# Patient Record
Sex: MALE | Race: WHITE
[De-identification: names, ages, dates, MRNs, and addresses within clinical notes are randomized per-mention and may not be internally consistent; named-entity substitution may affect disease eponyms.]

---

## 2019-02-08 ENCOUNTER — HOSPITAL ENCOUNTER (INPATIENT)
Dept: HOSPITAL 94 - ER | Age: 49
LOS: 1 days | Discharge: HOME | DRG: 254 | End: 2019-02-09
Attending: INTERNAL MEDICINE | Admitting: INTERNAL MEDICINE
Payer: MEDICAID

## 2019-02-08 VITALS — SYSTOLIC BLOOD PRESSURE: 130 MMHG | DIASTOLIC BLOOD PRESSURE: 76 MMHG

## 2019-02-08 VITALS — DIASTOLIC BLOOD PRESSURE: 85 MMHG | SYSTOLIC BLOOD PRESSURE: 133 MMHG

## 2019-02-08 VITALS — HEIGHT: 72 IN | WEIGHT: 229.28 LBS | BODY MASS INDEX: 31.05 KG/M2

## 2019-02-08 DIAGNOSIS — Z79.899: ICD-10-CM

## 2019-02-08 DIAGNOSIS — Z90.49: ICD-10-CM

## 2019-02-08 DIAGNOSIS — K35.80: Primary | ICD-10-CM

## 2019-02-08 DIAGNOSIS — K51.90: ICD-10-CM

## 2019-02-08 DIAGNOSIS — M54.9: ICD-10-CM

## 2019-02-08 DIAGNOSIS — G89.29: ICD-10-CM

## 2019-02-08 LAB
ALBUMIN SERPL BCP-MCNC: 3.9 G/DL (ref 3.4–5)
ALBUMIN/GLOB SERPL: 1.3 {RATIO} (ref 1.1–1.5)
ALP SERPL-CCNC: 60 IU/L (ref 46–116)
ALT SERPL W P-5'-P-CCNC: 50 U/L (ref 12–78)
ANION GAP SERPL CALCULATED.3IONS-SCNC: 9 MMOL/L (ref 8–16)
AST SERPL W P-5'-P-CCNC: 19 U/L (ref 10–37)
BACTERIA URNS QL MICRO: (no result) /HPF
BASOPHILS # BLD AUTO: 0 X10'3 (ref 0–0.2)
BASOPHILS NFR BLD AUTO: 0.1 % (ref 0–1)
BILIRUB SERPL-MCNC: 0.3 MG/DL (ref 0.1–1)
BUN SERPL-MCNC: 21 MG/DL (ref 7–18)
BUN/CREAT SERPL: 16.4 (ref 5.4–32)
CALCIUM SERPL-MCNC: 9 MG/DL (ref 8.5–10.1)
CHLORIDE SERPL-SCNC: 103 MMOL/L (ref 99–107)
CLARITY UR: CLEAR
CO2 SERPL-SCNC: 28 MMOL/L (ref 24–32)
COLOR UR: (no result)
CREAT SERPL-MCNC: 1.28 MG/DL (ref 0.6–1.1)
DEPRECATED SQUAMOUS URNS QL MICRO: (no result) /LPF
EOSINOPHIL # BLD AUTO: 0.4 X10'3 (ref 0–0.9)
EOSINOPHIL NFR BLD AUTO: 2.5 % (ref 0–6)
ERYTHROCYTE [DISTWIDTH] IN BLOOD BY AUTOMATED COUNT: 12.9 % (ref 11.5–14.5)
GFR SERPL CREATININE-BSD FRML MDRD: 61 ML/MIN
GLUCOSE SERPL-MCNC: 117 MG/DL (ref 70–104)
GLUCOSE UR STRIP-MCNC: NEGATIVE MG/DL
HCT VFR BLD AUTO: 46.2 % (ref 42–52)
HGB BLD-MCNC: 15.7 G/DL (ref 14–17.9)
HGB UR QL STRIP: (no result)
INR PPP: 1 INR
KETONES UR STRIP-MCNC: NEGATIVE MG/DL
LEUKOCYTE ESTERASE UR QL STRIP: NEGATIVE
LIPASE SERPL-CCNC: 170 U/L (ref 73–393)
LYMPHOCYTES # BLD AUTO: 2.3 X10'3 (ref 1.1–4.8)
LYMPHOCYTES NFR BLD AUTO: 14 % (ref 21–51)
MCH RBC QN AUTO: 30.7 PG (ref 27–31)
MCHC RBC AUTO-ENTMCNC: 34.1 G/DL (ref 33–36.5)
MCV RBC AUTO: 89.9 FL (ref 78–98)
MONOCYTES # BLD AUTO: 0.6 X10'3 (ref 0–0.9)
MONOCYTES NFR BLD AUTO: 3.6 % (ref 2–12)
MUCOUS THREADS URNS QL MICRO: (no result) /LPF
NEUTROPHILS # BLD AUTO: 13.2 X10'3 (ref 1.8–7.7)
NEUTROPHILS NFR BLD AUTO: 79.8 % (ref 42–75)
NITRITE UR QL STRIP: NEGATIVE
PH UR STRIP: 6 [PH] (ref 4.8–8)
PLATELET # BLD AUTO: 302 X10'3 (ref 140–440)
PMV BLD AUTO: 7.1 FL (ref 7.4–10.4)
POTASSIUM SERPL-SCNC: 3.7 MMOL/L (ref 3.5–5.1)
PROT SERPL-MCNC: 7 G/DL (ref 6.4–8.2)
PROT UR QL STRIP: NEGATIVE MG/DL
PROTHROMBIN TIME: 10.2 SECONDS (ref 9–12)
RBC # BLD AUTO: 5.13 X10'6 (ref 4.7–6.1)
RBC #/AREA URNS HPF: (no result) /HPF (ref 0–2)
SODIUM SERPL-SCNC: 140 MMOL/L (ref 135–145)
SP GR UR STRIP: 1.02 (ref 1–1.03)
URN COLLECT METHOD CLASS: (no result)
UROBILINOGEN UR STRIP-MCNC: 0.2 E.U/DL (ref 0.2–1)
WBC # BLD AUTO: 16.5 X10'3 (ref 4.5–11)
WBC #/AREA URNS HPF: (no result) /HPF (ref 0–4)

## 2019-02-08 PROCEDURE — 85025 COMPLETE CBC W/AUTO DIFF WBC: CPT

## 2019-02-08 PROCEDURE — 36415 COLL VENOUS BLD VENIPUNCTURE: CPT

## 2019-02-08 PROCEDURE — 85610 PROTHROMBIN TIME: CPT

## 2019-02-08 PROCEDURE — 87040 BLOOD CULTURE FOR BACTERIA: CPT

## 2019-02-08 PROCEDURE — 81001 URINALYSIS AUTO W/SCOPE: CPT

## 2019-02-08 PROCEDURE — 74177 CT ABD & PELVIS W/CONTRAST: CPT

## 2019-02-08 PROCEDURE — 74176 CT ABD & PELVIS W/O CONTRAST: CPT

## 2019-02-08 PROCEDURE — 80048 BASIC METABOLIC PNL TOTAL CA: CPT

## 2019-02-08 PROCEDURE — 83690 ASSAY OF LIPASE: CPT

## 2019-02-08 PROCEDURE — 96361 HYDRATE IV INFUSION ADD-ON: CPT

## 2019-02-08 PROCEDURE — 83605 ASSAY OF LACTIC ACID: CPT

## 2019-02-08 PROCEDURE — 80053 COMPREHEN METABOLIC PANEL: CPT

## 2019-02-08 PROCEDURE — 83735 ASSAY OF MAGNESIUM: CPT

## 2019-02-08 PROCEDURE — 96375 TX/PRO/DX INJ NEW DRUG ADDON: CPT

## 2019-02-08 PROCEDURE — 87070 CULTURE OTHR SPECIMN AEROBIC: CPT

## 2019-02-08 PROCEDURE — 99285 EMERGENCY DEPT VISIT HI MDM: CPT

## 2019-02-08 PROCEDURE — 96365 THER/PROPH/DIAG IV INF INIT: CPT

## 2019-02-08 RX ADMIN — SODIUM CHLORIDE SCH MLS/HR: 9 INJECTION INTRAMUSCULAR; INTRAVENOUS; SUBCUTANEOUS at 17:07

## 2019-02-08 RX ADMIN — Medication SCH MMU: at 19:59

## 2019-02-08 RX ADMIN — TAZOBACTAM SODIUM AND PIPERACILLIN SODIUM SCH MLS/HR: 375; 3 INJECTION, SOLUTION INTRAVENOUS at 11:44

## 2019-02-08 RX ADMIN — DIATRIZOATE MEGLUMINE AND DIATRIZOATE SODIUM SCH ML: 660; 100 LIQUID ORAL; RECTAL at 21:37

## 2019-02-08 RX ADMIN — TAZOBACTAM SODIUM AND PIPERACILLIN SODIUM SCH MLS/HR: 375; 3 INJECTION, SOLUTION INTRAVENOUS at 19:58

## 2019-02-08 RX ADMIN — SODIUM CHLORIDE SCH MLS/HR: 9 INJECTION INTRAMUSCULAR; INTRAVENOUS; SUBCUTANEOUS at 07:07

## 2019-02-09 VITALS — SYSTOLIC BLOOD PRESSURE: 101 MMHG | DIASTOLIC BLOOD PRESSURE: 54 MMHG

## 2019-02-09 VITALS — DIASTOLIC BLOOD PRESSURE: 59 MMHG | SYSTOLIC BLOOD PRESSURE: 109 MMHG

## 2019-02-09 VITALS — SYSTOLIC BLOOD PRESSURE: 116 MMHG | DIASTOLIC BLOOD PRESSURE: 67 MMHG

## 2019-02-09 LAB
ALBUMIN SERPL BCP-MCNC: 3.2 G/DL (ref 3.4–5)
ANION GAP SERPL CALCULATED.3IONS-SCNC: 8 MMOL/L (ref 8–16)
BASOPHILS # BLD AUTO: 0 X10'3 (ref 0–0.2)
BASOPHILS NFR BLD AUTO: 0.5 % (ref 0–1)
BUN SERPL-MCNC: 9 MG/DL (ref 7–18)
BUN/CREAT SERPL: 8.1 (ref 5.4–32)
CALCIUM SERPL-MCNC: 9 MG/DL (ref 8.5–10.1)
CHLORIDE SERPL-SCNC: 107 MMOL/L (ref 99–107)
CO2 SERPL-SCNC: 25.9 MMOL/L (ref 24–32)
CREAT SERPL-MCNC: 1.11 MG/DL (ref 0.6–1.1)
EOSINOPHIL # BLD AUTO: 0.1 X10'3 (ref 0–0.9)
EOSINOPHIL NFR BLD AUTO: 2.2 % (ref 0–6)
ERYTHROCYTE [DISTWIDTH] IN BLOOD BY AUTOMATED COUNT: 12.7 % (ref 11.5–14.5)
GFR SERPL CREATININE-BSD FRML MDRD: 71 ML/MIN
GLUCOSE SERPL-MCNC: 99 MG/DL (ref 70–104)
HCT VFR BLD AUTO: 42.8 % (ref 42–52)
HGB BLD-MCNC: 14.8 G/DL (ref 14–17.9)
LYMPHOCYTES # BLD AUTO: 2.2 X10'3 (ref 1.1–4.8)
LYMPHOCYTES NFR BLD AUTO: 33.6 % (ref 21–51)
MAGNESIUM SERPL-MCNC: 1.8 MG/DL (ref 1.5–2.4)
MCH RBC QN AUTO: 31.2 PG (ref 27–31)
MCHC RBC AUTO-ENTMCNC: 34.5 G/DL (ref 33–36.5)
MCV RBC AUTO: 90.4 FL (ref 78–98)
MONOCYTES # BLD AUTO: 0.6 X10'3 (ref 0–0.9)
MONOCYTES NFR BLD AUTO: 8.3 % (ref 2–12)
NEUTROPHILS # BLD AUTO: 3.7 X10'3 (ref 1.8–7.7)
NEUTROPHILS NFR BLD AUTO: 55.4 % (ref 42–75)
PLATELET # BLD AUTO: 242 X10'3 (ref 140–440)
PMV BLD AUTO: 7 FL (ref 7.4–10.4)
POTASSIUM SERPL-SCNC: 3.7 MMOL/L (ref 3.5–5.1)
RBC # BLD AUTO: 4.74 X10'6 (ref 4.7–6.1)
SODIUM SERPL-SCNC: 141 MMOL/L (ref 135–145)
WBC # BLD AUTO: 6.7 X10'3 (ref 4.5–11)

## 2019-02-09 RX ADMIN — TAZOBACTAM SODIUM AND PIPERACILLIN SODIUM SCH MLS/HR: 375; 3 INJECTION, SOLUTION INTRAVENOUS at 03:27

## 2019-02-09 RX ADMIN — SODIUM CHLORIDE SCH MLS/HR: 9 INJECTION INTRAMUSCULAR; INTRAVENOUS; SUBCUTANEOUS at 13:07

## 2019-02-09 RX ADMIN — DIATRIZOATE MEGLUMINE AND DIATRIZOATE SODIUM SCH ML: 660; 100 LIQUID ORAL; RECTAL at 09:17

## 2019-02-09 RX ADMIN — TAZOBACTAM SODIUM AND PIPERACILLIN SODIUM SCH MLS/HR: 375; 3 INJECTION, SOLUTION INTRAVENOUS at 11:43

## 2019-02-09 RX ADMIN — SODIUM CHLORIDE SCH MLS/HR: 9 INJECTION INTRAMUSCULAR; INTRAVENOUS; SUBCUTANEOUS at 00:33

## 2019-02-09 RX ADMIN — DIATRIZOATE MEGLUMINE AND DIATRIZOATE SODIUM SCH ML: 660; 100 LIQUID ORAL; RECTAL at 07:17

## 2019-02-09 RX ADMIN — Medication SCH MMU: at 07:05

## 2020-06-09 ENCOUNTER — HOSPITAL ENCOUNTER (INPATIENT)
Dept: HOSPITAL 94 - ER | Age: 50
LOS: 2 days | Discharge: HOME | DRG: 234 | End: 2020-06-11
Attending: FAMILY MEDICINE | Admitting: SPECIALIST
Payer: MEDICAID

## 2020-06-09 VITALS — WEIGHT: 224.43 LBS | HEIGHT: 74 IN | BODY MASS INDEX: 28.8 KG/M2

## 2020-06-09 DIAGNOSIS — M54.9: ICD-10-CM

## 2020-06-09 DIAGNOSIS — K51.90: ICD-10-CM

## 2020-06-09 DIAGNOSIS — K35.80: Primary | ICD-10-CM

## 2020-06-09 DIAGNOSIS — G89.29: ICD-10-CM

## 2020-06-09 DIAGNOSIS — Z90.49: ICD-10-CM

## 2020-06-09 DIAGNOSIS — K52.9: ICD-10-CM

## 2020-06-09 DIAGNOSIS — E87.6: ICD-10-CM

## 2020-06-09 DIAGNOSIS — N17.9: ICD-10-CM

## 2020-06-09 LAB
ALBUMIN SERPL BCP-MCNC: 4.5 G/DL (ref 3.4–5)
ALBUMIN/GLOB SERPL: 1.3 {RATIO} (ref 1.1–1.5)
ALP SERPL-CCNC: 61 IU/L (ref 46–116)
ALT SERPL W P-5'-P-CCNC: 65 U/L (ref 12–78)
ANION GAP SERPL CALCULATED.3IONS-SCNC: 15 MMOL/L (ref 8–16)
AST SERPL W P-5'-P-CCNC: 42 U/L (ref 10–37)
BASOPHILS # BLD AUTO: 0.1 X10'3 (ref 0–0.2)
BASOPHILS NFR BLD AUTO: 0.3 % (ref 0–1)
BILIRUB SERPL-MCNC: 0.9 MG/DL (ref 0.1–1)
BUN SERPL-MCNC: 23 MG/DL (ref 7–18)
BUN/CREAT SERPL: 12.9 (ref 5.4–32)
CALCIUM SERPL-MCNC: 9 MG/DL (ref 8.5–10.1)
CHLORIDE SERPL-SCNC: 101 MMOL/L (ref 99–107)
CO2 SERPL-SCNC: 21.9 MMOL/L (ref 24–32)
CREAT SERPL-MCNC: 1.78 MG/DL (ref 0.6–1.1)
EOSINOPHIL # BLD AUTO: 0 X10'3 (ref 0–0.9)
EOSINOPHIL NFR BLD AUTO: 0.2 % (ref 0–6)
ERYTHROCYTE [DISTWIDTH] IN BLOOD BY AUTOMATED COUNT: 13.5 % (ref 11.5–14.5)
GFR SERPL CREATININE-BSD FRML MDRD: 41 ML/MIN
GLUCOSE SERPL-MCNC: 112 MG/DL (ref 70–104)
HCT VFR BLD AUTO: 49.1 % (ref 42–52)
HGB BLD-MCNC: 16.7 G/DL (ref 14–17.9)
LIPASE SERPL-CCNC: 89 U/L (ref 73–393)
LYMPHOCYTES # BLD AUTO: 2.2 X10'3 (ref 1.1–4.8)
LYMPHOCYTES NFR BLD AUTO: 10.2 % (ref 21–51)
MCH RBC QN AUTO: 30.5 PG (ref 27–31)
MCHC RBC AUTO-ENTMCNC: 34.1 G/DL (ref 33–36.5)
MCV RBC AUTO: 89.5 FL (ref 78–98)
MONOCYTES # BLD AUTO: 1.8 X10'3 (ref 0–0.9)
MONOCYTES NFR BLD AUTO: 8.1 % (ref 2–12)
NEUTROPHILS # BLD AUTO: 17.5 X10'3 (ref 1.8–7.7)
NEUTROPHILS NFR BLD AUTO: 81.2 % (ref 42–75)
PLATELET # BLD AUTO: 313 X10'3 (ref 140–440)
PMV BLD AUTO: 6.9 FL (ref 7.4–10.4)
POTASSIUM SERPL-SCNC: 3.4 MMOL/L (ref 3.5–5.1)
PROT SERPL-MCNC: 7.9 G/DL (ref 6.4–8.2)
RBC # BLD AUTO: 5.48 X10'6 (ref 4.7–6.1)
SODIUM SERPL-SCNC: 138 MMOL/L (ref 135–145)
WBC # BLD AUTO: 21.6 X10'3 (ref 4.5–11)

## 2020-06-09 PROCEDURE — 99285 EMERGENCY DEPT VISIT HI MDM: CPT

## 2020-06-09 PROCEDURE — 74176 CT ABD & PELVIS W/O CONTRAST: CPT

## 2020-06-09 PROCEDURE — 36415 COLL VENOUS BLD VENIPUNCTURE: CPT

## 2020-06-09 PROCEDURE — 83690 ASSAY OF LIPASE: CPT

## 2020-06-09 PROCEDURE — 85025 COMPLETE CBC W/AUTO DIFF WBC: CPT

## 2020-06-09 PROCEDURE — 82948 REAGENT STRIP/BLOOD GLUCOSE: CPT

## 2020-06-09 PROCEDURE — 96374 THER/PROPH/DIAG INJ IV PUSH: CPT

## 2020-06-09 PROCEDURE — 87081 CULTURE SCREEN ONLY: CPT

## 2020-06-09 PROCEDURE — 85730 THROMBOPLASTIN TIME PARTIAL: CPT

## 2020-06-09 PROCEDURE — 81001 URINALYSIS AUTO W/SCOPE: CPT

## 2020-06-09 PROCEDURE — 80053 COMPREHEN METABOLIC PANEL: CPT

## 2020-06-09 PROCEDURE — 85610 PROTHROMBIN TIME: CPT

## 2020-06-09 PROCEDURE — 74177 CT ABD & PELVIS W/CONTRAST: CPT

## 2020-06-10 VITALS — DIASTOLIC BLOOD PRESSURE: 84 MMHG | SYSTOLIC BLOOD PRESSURE: 130 MMHG

## 2020-06-10 VITALS — SYSTOLIC BLOOD PRESSURE: 132 MMHG | DIASTOLIC BLOOD PRESSURE: 80 MMHG

## 2020-06-10 VITALS — SYSTOLIC BLOOD PRESSURE: 132 MMHG | DIASTOLIC BLOOD PRESSURE: 88 MMHG

## 2020-06-10 VITALS — DIASTOLIC BLOOD PRESSURE: 64 MMHG | SYSTOLIC BLOOD PRESSURE: 121 MMHG

## 2020-06-10 VITALS — DIASTOLIC BLOOD PRESSURE: 85 MMHG | SYSTOLIC BLOOD PRESSURE: 130 MMHG

## 2020-06-10 VITALS — DIASTOLIC BLOOD PRESSURE: 84 MMHG | SYSTOLIC BLOOD PRESSURE: 125 MMHG

## 2020-06-10 LAB
APTT PPP: 28 SECONDS (ref 22–32)
BACTERIA URNS QL MICRO: (no result) /HPF
CLARITY UR: (no result)
COLOR UR: YELLOW
DEPRECATED SQUAMOUS URNS QL MICRO: (no result) /LPF
GLUCOSE UR STRIP-MCNC: NEGATIVE MG/DL
HGB UR QL STRIP: (no result)
KETONES UR STRIP-MCNC: NEGATIVE MG/DL
LEUKOCYTE ESTERASE UR QL STRIP: NEGATIVE
MUCOUS THREADS URNS QL MICRO: (no result) /LPF
NITRITE UR QL STRIP: NEGATIVE
PH UR STRIP: 5.5 [PH] (ref 4.8–8)
PROT UR QL STRIP: (no result) MG/DL
RBC #/AREA URNS HPF: (no result) /HPF (ref 0–2)
SP GR UR STRIP: >=1.03 (ref 1–1.03)
TYROSINE CRYSTALS [#/AREA] IN URINE SEDIMENT BY MICROSCOPY HIGH POWER FIELD: (no result) /HPF
URN COLLECT METHOD CLASS: (no result)
UROBILINOGEN UR STRIP-MCNC: 0.2 E.U/DL (ref 0.2–1)
WBC #/AREA URNS HPF: (no result) /HPF (ref 0–4)

## 2020-06-10 RX ADMIN — TAZOBACTAM SODIUM AND PIPERACILLIN SODIUM SCH MLS/HR: 375; 3 INJECTION, SOLUTION INTRAVENOUS at 16:05

## 2020-06-10 RX ADMIN — DIATRIZOATE MEGLUMINE AND DIATRIZOATE SODIUM SCH ML: 660; 100 LIQUID ORAL; RECTAL at 01:17

## 2020-06-10 RX ADMIN — SODIUM CHLORIDE SCH MLS/HR: 9 INJECTION INTRAMUSCULAR; INTRAVENOUS; SUBCUTANEOUS at 16:05

## 2020-06-10 RX ADMIN — SODIUM CHLORIDE SCH MLS/HR: 9 INJECTION INTRAMUSCULAR; INTRAVENOUS; SUBCUTANEOUS at 21:10

## 2020-06-10 RX ADMIN — SODIUM CHLORIDE SCH MLS/HR: 9 INJECTION INTRAMUSCULAR; INTRAVENOUS; SUBCUTANEOUS at 02:00

## 2020-06-10 RX ADMIN — DIATRIZOATE MEGLUMINE AND DIATRIZOATE SODIUM SCH ML: 660; 100 LIQUID ORAL; RECTAL at 06:59

## 2020-06-10 RX ADMIN — TAZOBACTAM SODIUM AND PIPERACILLIN SODIUM SCH MLS/HR: 375; 3 INJECTION, SOLUTION INTRAVENOUS at 09:12

## 2020-06-10 RX ADMIN — Medication SCH MMU: at 21:06

## 2020-06-10 RX ADMIN — DIATRIZOATE MEGLUMINE AND DIATRIZOATE SODIUM SCH ML: 660; 100 LIQUID ORAL; RECTAL at 10:32

## 2020-06-10 NOTE — NUR
patient given new warm blankets rr even un labored denies the need for pain 
medication when asked patient states " i feel ok and getting some sleep 
tonight."

## 2020-06-10 NOTE — NUR
patient c/o extreme pain 10/10 and nausea, dr butterfield contacted new orders given for 
Dilaudid and Compazine, see Emar. Patient stated he only wanted compazine to start. 
Administered with immediate relief. Resting after this .Problems reprioritized. Patient 
report given, questions answered & plan of care reviewed with Shelly..

## 2020-06-10 NOTE — NUR
patient stated " I am ok." when asked if pain was not tolerable patient is 
currently lying in bed on left side covers on eyes closed  rr even un labored 
no observable s/s of acute stress or pain observable will continue to monitor

## 2020-06-10 NOTE — NUR
patient in bed covers on lying on left side eyes closed rr even un labored no 
observable s/s of acute stress/pain observable at this time will continue to 
monitor

## 2020-06-10 NOTE — NUR
called to give report, was told that S[facility] Mejia will be taking the pt, but the 
room is not clean, they will call when ready. Updated LAN Zacarias

## 2020-06-10 NOTE — NUR
ALL CRITERIA FOR DC TO THE FLOOR HAS BEEN ACHIEVED. 2 RAILS UP. BED LOW, CALL LIGHT 
PRESENT. GAVE REPORT TO SANTANA EDOUARD       . PAIN AT A TOLERABLE LEVEL AT THIS TIME. DRESSINGS 
 CDI. CARE TURNED OVER TO RN. 

-------------------------------------------------------------------------------

Addendum: 06/10/20 at 1441 by Matteo Soria - SANTANA RN

-------------------------------------------------------------------------------

Amended: Links added.

## 2020-06-10 NOTE — NUR
1445 report given to this RN by Matteo DILLON, received patient into 347b, . Patient pbserbed to 
have three bandaide sites CDI, O2/3l o2 sats 96%. No c/o pain, Post op VS commenced will 
continue to monitor.

## 2020-06-10 NOTE — NUR
Received from OR via SURGICAL BED , accompanied by Anesthesiologist ERIC and report 
given by Anesthesiolgist. 3 ANTERIOR BANDAIDS TO ABDOMEN THAT ARE CDI. VSS. DENIES PAIN, 
SCDS DONNED. 10L MASK ON WITH 98% SATURATIONS


-------------------------------------------------------------------------------

Addendum: 06/10/20 at 1410 by Matteo Lopez RN, RN

-------------------------------------------------------------------------------

Amended: Links added.

## 2020-06-10 NOTE — NUR
called again to give report, was told that the nurseJackie is in a room and 
would call me back, and that the room is still not clean

## 2020-06-11 VITALS — SYSTOLIC BLOOD PRESSURE: 117 MMHG | DIASTOLIC BLOOD PRESSURE: 75 MMHG

## 2020-06-11 VITALS — SYSTOLIC BLOOD PRESSURE: 95 MMHG | DIASTOLIC BLOOD PRESSURE: 52 MMHG

## 2020-06-11 VITALS — DIASTOLIC BLOOD PRESSURE: 52 MMHG | SYSTOLIC BLOOD PRESSURE: 106 MMHG

## 2020-06-11 VITALS — SYSTOLIC BLOOD PRESSURE: 105 MMHG | DIASTOLIC BLOOD PRESSURE: 71 MMHG

## 2020-06-11 LAB
ALBUMIN SERPL BCP-MCNC: 3.6 G/DL (ref 3.4–5)
ALBUMIN/GLOB SERPL: 1.1 {RATIO} (ref 1.1–1.5)
ALP SERPL-CCNC: 56 IU/L (ref 46–116)
ALT SERPL W P-5'-P-CCNC: 50 U/L (ref 12–78)
ANION GAP SERPL CALCULATED.3IONS-SCNC: 9 MMOL/L (ref 8–16)
AST SERPL W P-5'-P-CCNC: 25 U/L (ref 10–37)
BASOPHILS # BLD AUTO: 0 X10'3 (ref 0–0.2)
BASOPHILS NFR BLD AUTO: 0.1 % (ref 0–1)
BILIRUB SERPL-MCNC: 0.6 MG/DL (ref 0.1–1)
BUN SERPL-MCNC: 10 MG/DL (ref 7–18)
BUN/CREAT SERPL: 6.1 (ref 5.4–32)
CALCIUM SERPL-MCNC: 8.6 MG/DL (ref 8.5–10.1)
CHLORIDE SERPL-SCNC: 107 MMOL/L (ref 99–107)
CO2 SERPL-SCNC: 25.7 MMOL/L (ref 24–32)
CREAT SERPL-MCNC: 1.63 MG/DL (ref 0.6–1.1)
EOSINOPHIL # BLD AUTO: 0 X10'3 (ref 0–0.9)
EOSINOPHIL NFR BLD AUTO: 0 % (ref 0–6)
ERYTHROCYTE [DISTWIDTH] IN BLOOD BY AUTOMATED COUNT: 13.2 % (ref 11.5–14.5)
GFR SERPL CREATININE-BSD FRML MDRD: 45 ML/MIN
GLUCOSE SERPL-MCNC: 126 MG/DL (ref 70–104)
HCT VFR BLD AUTO: 44.5 % (ref 42–52)
HGB BLD-MCNC: 15 G/DL (ref 14–17.9)
LYMPHOCYTES # BLD AUTO: 1.9 X10'3 (ref 1.1–4.8)
LYMPHOCYTES NFR BLD AUTO: 16.6 % (ref 21–51)
MCH RBC QN AUTO: 30.2 PG (ref 27–31)
MCHC RBC AUTO-ENTMCNC: 33.6 G/DL (ref 33–36.5)
MCV RBC AUTO: 89.9 FL (ref 78–98)
MONOCYTES # BLD AUTO: 0.9 X10'3 (ref 0–0.9)
MONOCYTES NFR BLD AUTO: 8 % (ref 2–12)
NEUTROPHILS # BLD AUTO: 8.5 X10'3 (ref 1.8–7.7)
NEUTROPHILS NFR BLD AUTO: 75.3 % (ref 42–75)
PLATELET # BLD AUTO: 309 X10'3 (ref 140–440)
PMV BLD AUTO: 7.1 FL (ref 7.4–10.4)
POTASSIUM SERPL-SCNC: 4 MMOL/L (ref 3.5–5.1)
PROT SERPL-MCNC: 6.8 G/DL (ref 6.4–8.2)
RBC # BLD AUTO: 4.95 X10'6 (ref 4.7–6.1)
SODIUM SERPL-SCNC: 142 MMOL/L (ref 135–145)
WBC # BLD AUTO: 11.3 X10'3 (ref 4.5–11)

## 2020-06-11 PROCEDURE — 0DTJ4ZZ RESECTION OF APPENDIX, PERCUTANEOUS ENDOSCOPIC APPROACH: ICD-10-PCS | Performed by: SURGERY

## 2020-06-11 RX ADMIN — Medication SCH MMU: at 09:10

## 2020-06-11 RX ADMIN — TAZOBACTAM SODIUM AND PIPERACILLIN SODIUM SCH MLS/HR: 375; 3 INJECTION, SOLUTION INTRAVENOUS at 00:29

## 2020-06-11 RX ADMIN — SODIUM CHLORIDE SCH MLS/HR: 9 INJECTION INTRAMUSCULAR; INTRAVENOUS; SUBCUTANEOUS at 01:37

## 2020-06-11 RX ADMIN — TAZOBACTAM SODIUM AND PIPERACILLIN SODIUM SCH MLS/HR: 375; 3 INJECTION, SOLUTION INTRAVENOUS at 09:11

## 2020-06-11 NOTE — NUR
Dr Londono rounded and discussed w/ pt ok to discharge home today from a surgical 
standpoint, and pt is aware hospitalist will enter discharge orders.

## 2020-06-11 NOTE — NUR
Educated patient on discharge instructions and discharge antibiotics of 1 capsule three 
times a day per Dr. Londono and patient states he understands with no further questions. 
Patient states he will follow up with Dr. Londono in one week and make the appointment when 
he gets home, and follow up with "Jerrod" his primary care physician in 2 weeks, per MD 
suggestion. Patient transported down to his wife's care via wheelchair and nursing staff 
safely, all belongings sent with him.

## 2025-04-15 NOTE — NUR
CALLED [phone] TO GIVE SHOBHA DILLON
PATIENT RECEIVED OIN BED AWAKE,MOVING TOWARDS ADMITTING PATEINT PER ED MD 
ORDER.DIFFUSED ABDOMINAL PAIN 1/10.NO NAUSEA/VOMITING AT THIS TIME.MWD REC 
COMPLETED.
PHONE REPORT TO SILVERIO DILLON SURGICAL PATIENT TO GO TO ROOM 021O
PT DISCHARGED IN STABLE CONDITION. LEFT FACILITY IN PRIVATE VEHICLE. IV DC CANULA INTACT. 
ALL BELONGINGS IN HAND. FOLLOW UP INSTRUCTIONS GIVEN, ALL QUESTIONS ANSWERED. DISCUSSED WITH 
PT THAT DR TRAORE WANTS PT TO HAVE OUTPT COLONOSCOPY AFTER ABX TREATMENT COMPLETE., PER 
CHARGE NURSE.  PT STATES THAT HE WILL DISCUSS WITH PRIMARY MD AT NEXT VISIT ON MONDAY. 

-------------------------------------------------------------------------------

Addendum: 02/09/19 at 1615 by Alycia Underwood RN

-------------------------------------------------------------------------------

Amended: Links added.
Patient in room [facility]. I have received report from  VICTOR HUGO DILLON  and had the opportunity to 
ask questions and assume patient care.
Patient in room [facility]. I have received report from  Velvet DILLON  and had the opportunity to 
ask questions and assume patient care.
Problems reprioritized. Patient report given, questions answered & plan of care reviewed 
with VICTOR HUGO DILLON.
Problems reprioritized. Patient report given, questions answered & plan of care reviewed 
with hussain DILLON.
RECD REPORT FROM VIRAJ IN ER.  PT ARRIVED TO UNIT ALERT & ORIENTED. CALL LIGHT WITHIN 
REACH, VSS
REPORT TO SHOBHA DILLON OR : EXP LAP BY DR EUGENIE BISHOP AT 1900
SPOKE TO DR TRAORE RE DIET FOR PATIENT
STILL AWAITING FOR NEW ROOM ASSIGNMENT.
[phone]